# Patient Record
Sex: FEMALE | Race: WHITE | NOT HISPANIC OR LATINO | URBAN - METROPOLITAN AREA
[De-identification: names, ages, dates, MRNs, and addresses within clinical notes are randomized per-mention and may not be internally consistent; named-entity substitution may affect disease eponyms.]

---

## 2017-05-05 ENCOUNTER — OFFICE (OUTPATIENT)
Dept: URBAN - METROPOLITAN AREA CLINIC 101 | Facility: CLINIC | Age: 62
End: 2017-05-05

## 2017-05-05 VITALS
WEIGHT: 150 LBS | TEMPERATURE: 98.1 F | SYSTOLIC BLOOD PRESSURE: 122 MMHG | HEART RATE: 81 BPM | DIASTOLIC BLOOD PRESSURE: 74 MMHG | HEIGHT: 66 IN

## 2017-05-05 DIAGNOSIS — K62.5 HEMORRHAGE OF ANUS AND RECTUM: ICD-10-CM

## 2017-05-05 DIAGNOSIS — K64.8 OTHER HEMORRHOIDS: ICD-10-CM

## 2017-05-05 DIAGNOSIS — K64.4 RESIDUAL HEMORRHOIDAL SKIN TAGS: ICD-10-CM

## 2017-05-05 PROCEDURE — 99244 OFF/OP CNSLTJ NEW/EST MOD 40: CPT

## 2017-05-05 NOTE — SERVICEHPINOTES
MARIA G CABRAL   is a   62   year old    female who is being seen in consultation at the request of   STACEY TOVAR   for hemorrhoids. She has issues with intermittent bleeding from hemorrhoids for several years, but has gotten progressively worse. Sometimes the bleeding is so bad she was to wear a pad. She has tried Preperation H, but not sure that it helps in terms of bleeding. She thinks the bleeding might coincide with when she takes ASA. She has a BM daily. Stools are BSS type 3-4. Rarely strains with defecation. She reports having a rectocele and has to manually apply pressure to her perineum to pass stool. She also has a cystocele. She has lost #2 over the last year. Has rare RLQ pain. More of annoying pain. She feels like she might not be completely emptying out, this only occurs every 3-4 months. Her maternal uncle had colon cancer. She reports having a colonoscopy 2/2015 at [facility]-she reports having bleeding due to the prep and hemorrhoids, and reports having a benign polyp-will obtain records.

## 2017-05-17 ENCOUNTER — OFFICE (OUTPATIENT)
Dept: URBAN - METROPOLITAN AREA CLINIC 32 | Facility: CLINIC | Age: 62
End: 2017-05-17
Payer: COMMERCIAL

## 2017-05-17 VITALS
HEIGHT: 66 IN | TEMPERATURE: 97.3 F | TEMPERATURE: 98.1 F | OXYGEN SATURATION: 96 % | HEART RATE: 72 BPM | DIASTOLIC BLOOD PRESSURE: 73 MMHG | OXYGEN SATURATION: 97 % | SYSTOLIC BLOOD PRESSURE: 128 MMHG | DIASTOLIC BLOOD PRESSURE: 81 MMHG | HEART RATE: 77 BPM | RESPIRATION RATE: 14 BRPM | SYSTOLIC BLOOD PRESSURE: 106 MMHG | RESPIRATION RATE: 16 BRPM | WEIGHT: 150 LBS

## 2017-05-17 DIAGNOSIS — K62.5 HEMORRHAGE OF ANUS AND RECTUM: ICD-10-CM

## 2017-05-17 PROBLEM — K64.1 SECOND DEGREE HEMORRHOIDS: Status: ACTIVE | Noted: 2017-05-17

## 2017-05-17 PROBLEM — K57.30 DVRTCLOS OF LG INT W/O PERFORATION OR ABSCESS W/O BLEEDING: Status: ACTIVE | Noted: 2017-05-17

## 2017-05-17 PROCEDURE — 00810: CPT | Mod: AA,QS

## 2017-05-17 PROCEDURE — 00810: CPT | Mod: QS,AA

## 2017-06-30 ENCOUNTER — OFFICE (OUTPATIENT)
Dept: URBAN - METROPOLITAN AREA CLINIC 101 | Facility: CLINIC | Age: 62
End: 2017-06-30

## 2017-06-30 VITALS
HEIGHT: 66 IN | DIASTOLIC BLOOD PRESSURE: 84 MMHG | TEMPERATURE: 97.9 F | HEART RATE: 81 BPM | SYSTOLIC BLOOD PRESSURE: 133 MMHG | WEIGHT: 148 LBS

## 2017-06-30 DIAGNOSIS — K64.8 OTHER HEMORRHOIDS: ICD-10-CM

## 2017-06-30 DIAGNOSIS — K62.5 HEMORRHAGE OF ANUS AND RECTUM: ICD-10-CM

## 2017-06-30 PROCEDURE — 46221 LIGATION OF HEMORRHOID(S): CPT

## 2017-08-16 ENCOUNTER — OFFICE (OUTPATIENT)
Dept: URBAN - METROPOLITAN AREA CLINIC 33 | Facility: CLINIC | Age: 62
End: 2017-08-16

## 2017-08-16 VITALS
HEIGHT: 66 IN | DIASTOLIC BLOOD PRESSURE: 81 MMHG | SYSTOLIC BLOOD PRESSURE: 114 MMHG | HEART RATE: 86 BPM | TEMPERATURE: 98.1 F | WEIGHT: 151 LBS

## 2017-08-16 DIAGNOSIS — K64.1 SECOND DEGREE HEMORRHOIDS: ICD-10-CM

## 2017-08-16 DIAGNOSIS — K64.8 OTHER HEMORRHOIDS: ICD-10-CM

## 2017-08-16 DIAGNOSIS — K62.5 HEMORRHAGE OF ANUS AND RECTUM: ICD-10-CM

## 2017-08-16 PROCEDURE — 46221 LIGATION OF HEMORRHOID(S): CPT

## 2017-09-06 ENCOUNTER — OFFICE (OUTPATIENT)
Dept: URBAN - METROPOLITAN AREA CLINIC 33 | Facility: CLINIC | Age: 62
End: 2017-09-06

## 2017-09-06 VITALS
TEMPERATURE: 97.5 F | DIASTOLIC BLOOD PRESSURE: 75 MMHG | WEIGHT: 150 LBS | HEIGHT: 66 IN | HEART RATE: 68 BPM | SYSTOLIC BLOOD PRESSURE: 122 MMHG

## 2017-09-06 DIAGNOSIS — K64.1 SECOND DEGREE HEMORRHOIDS: ICD-10-CM

## 2017-09-06 DIAGNOSIS — K62.5 HEMORRHAGE OF ANUS AND RECTUM: ICD-10-CM

## 2017-09-06 PROCEDURE — 46221 LIGATION OF HEMORRHOID(S): CPT

## 2023-05-19 ENCOUNTER — OFFICE (OUTPATIENT)
Dept: URBAN - METROPOLITAN AREA CLINIC 102 | Facility: CLINIC | Age: 68
End: 2023-05-19
Payer: COMMERCIAL

## 2023-05-19 VITALS
DIASTOLIC BLOOD PRESSURE: 97 MMHG | TEMPERATURE: 98.6 F | HEIGHT: 66 IN | HEART RATE: 71 BPM | SYSTOLIC BLOOD PRESSURE: 162 MMHG | WEIGHT: 148 LBS

## 2023-05-19 DIAGNOSIS — K64.8 OTHER HEMORRHOIDS: ICD-10-CM

## 2023-05-19 DIAGNOSIS — K59.09 OTHER CONSTIPATION: ICD-10-CM

## 2023-05-19 DIAGNOSIS — R19.8 OTHER SPECIFIED SYMPTOMS AND SIGNS INVOLVING THE DIGESTIVE S: ICD-10-CM

## 2023-05-19 DIAGNOSIS — K64.4 RESIDUAL HEMORRHOIDAL SKIN TAGS: ICD-10-CM

## 2023-05-19 PROCEDURE — 99204 OFFICE O/P NEW MOD 45 MIN: CPT | Performed by: NURSE PRACTITIONER

## 2023-05-19 RX ORDER — HYDROCORTISONE 25 MG/G
CREAM TOPICAL
Qty: 30 | Refills: 3 | Status: ACTIVE
Start: 2023-05-19

## 2023-05-19 NOTE — SERVICEHPINOTES
MARIA G CABRAL   is a   68  female who presents with bowel changes. BMs are not like used to be since a year ago. Increased gas. Tried a course of probiotics which did not help but it changed the bowel habits. + Mucous seen in stool, seen at urgent care and X-ray showed signs of constipation. Tried Mag citrate then developed abdominal cramps and ringing in ear. See at urgent care and told to be constipated again. 
brMoves bowel daily and it does not feel like she was defecated all the way. Occasional hemorrhoid bleeding. Feels like the rectum is prolapsing. Hard to clean. + Leakage and mucous discharge. Denies abdominal pain or weight loss. Rare nausea without vomiting. + Burp. Denies dysphagia. Slight acid reflux. Denies epigastric pain. 
gemma 
Pt reports a hx of cystocele and rectocele repair about 10 years ago. gemma menendezMaternal uncle had a hx of colon cancer in 60's. 
gemma Denies personal hx of CVD.  gemma menendez

## 2023-10-23 ENCOUNTER — TELEHEALTH PROVIDED OTHER THAN IN PATIENT'S HOME (OUTPATIENT)
Dept: URBAN - METROPOLITAN AREA TELEHEALTH 7 | Facility: TELEHEALTH | Age: 68
End: 2023-10-23
Payer: COMMERCIAL

## 2023-10-23 VITALS — WEIGHT: 146 LBS | HEIGHT: 66 IN

## 2023-10-23 DIAGNOSIS — K59.09 OTHER CONSTIPATION: ICD-10-CM

## 2023-10-23 DIAGNOSIS — R10.30 LOWER ABDOMINAL PAIN, UNSPECIFIED: ICD-10-CM

## 2023-10-23 DIAGNOSIS — N81.10 CYSTOCELE, UNSPECIFIED: ICD-10-CM

## 2023-10-23 PROCEDURE — 99214 OFFICE O/P EST MOD 30 MIN: CPT | Mod: 95 | Performed by: NURSE PRACTITIONER

## 2023-10-23 RX ORDER — LINACLOTIDE 145 UG/1
CAPSULE, GELATIN COATED ORAL
Qty: 30 | Refills: 11 | Status: ACTIVE
Start: 2023-10-23

## 2023-10-23 NOTE — SERVICENOTES
Patient's visit was conducted through [employer] telecommunication. Patient consented before the start of visit as to understanding of privacy concerns, possible technological failure, and their responsibility of carrying out instructions of plan.

## 2023-10-23 NOTE — SERVICEHPINOTES
PATIENT VERIFIED BY DATE OF BIRTH AND NAME. Patient has been consented for this telecommunication visit. 
br Having trouble over year and a half. Things are not working correctly. Had a lot of discomfort when made a call for this visit. br Tried metamucil which did not work. Miralax helps some. Still have a lot of discomfort at lower abdomen.
br + Gas and burping. Moves bowel daily. Occasional rectal bleeding on wipe, hx of hemorrhoids. Pt reports a surgery in 2019 to repair rectocele and cystocele. The rectocele became loose right away. Never feels like eliminating the stool completely. Occasionally has to put finger inside vagina to remove the stool. 
br Denies weight loss, dysphagia or epigastic pain. br Occasional nausea without vomiting. Some acid reflux br 
br All 10 point review of systems have been reviewed as per HPI and otherwise negative.